# Patient Record
Sex: FEMALE | Race: BLACK OR AFRICAN AMERICAN | NOT HISPANIC OR LATINO | Employment: FULL TIME | URBAN - METROPOLITAN AREA
[De-identification: names, ages, dates, MRNs, and addresses within clinical notes are randomized per-mention and may not be internally consistent; named-entity substitution may affect disease eponyms.]

---

## 2017-01-20 ENCOUNTER — LAB VISIT (OUTPATIENT)
Dept: LAB | Facility: HOSPITAL | Age: 37
End: 2017-01-20
Attending: ADVANCED PRACTICE MIDWIFE
Payer: COMMERCIAL

## 2017-01-20 ENCOUNTER — OFFICE VISIT (OUTPATIENT)
Dept: OBSTETRICS AND GYNECOLOGY | Facility: CLINIC | Age: 37
End: 2017-01-20
Payer: COMMERCIAL

## 2017-01-20 VITALS
SYSTOLIC BLOOD PRESSURE: 104 MMHG | BODY MASS INDEX: 34.78 KG/M2 | HEIGHT: 67 IN | DIASTOLIC BLOOD PRESSURE: 70 MMHG | WEIGHT: 221.56 LBS

## 2017-01-20 DIAGNOSIS — Z01.419 ROUTINE GYNECOLOGICAL EXAMINATION: Primary | ICD-10-CM

## 2017-01-20 DIAGNOSIS — Z11.3 SCREEN FOR STD (SEXUALLY TRANSMITTED DISEASE): ICD-10-CM

## 2017-01-20 DIAGNOSIS — N89.8 VAGINAL IRRITATION: ICD-10-CM

## 2017-01-20 PROCEDURE — 99395 PREV VISIT EST AGE 18-39: CPT | Mod: S$GLB,,, | Performed by: ADVANCED PRACTICE MIDWIFE

## 2017-01-20 PROCEDURE — 99999 PR PBB SHADOW E&M-EST. PATIENT-LVL II: CPT | Mod: PBBFAC,,, | Performed by: ADVANCED PRACTICE MIDWIFE

## 2017-01-20 PROCEDURE — 86592 SYPHILIS TEST NON-TREP QUAL: CPT

## 2017-01-20 PROCEDURE — 88175 CYTOPATH C/V AUTO FLUID REDO: CPT

## 2017-01-20 PROCEDURE — 36415 COLL VENOUS BLD VENIPUNCTURE: CPT | Mod: PO

## 2017-01-20 PROCEDURE — 87591 N.GONORRHOEAE DNA AMP PROB: CPT

## 2017-01-20 PROCEDURE — 80074 ACUTE HEPATITIS PANEL: CPT

## 2017-01-20 RX ORDER — AZELASTINE 1 MG/ML
1 SPRAY, METERED NASAL
COMMUNITY
Start: 2017-01-17 | End: 2018-01-17

## 2017-01-20 RX ORDER — FLUOCINONIDE 0.5 MG/G
CREAM TOPICAL
COMMUNITY
Start: 2016-09-09 | End: 2017-09-09

## 2017-01-20 RX ORDER — TRIAMCINOLONE ACETONIDE 0.25 MG/G
CREAM TOPICAL 2 TIMES DAILY
Qty: 15 G | Refills: 0 | Status: SHIPPED | OUTPATIENT
Start: 2017-01-20 | End: 2017-09-25 | Stop reason: SDUPTHER

## 2017-01-20 RX ORDER — AZITHROMYCIN 250 MG/1
TABLET, FILM COATED ORAL
Refills: 0 | COMMUNITY
Start: 2017-01-17

## 2017-01-20 RX ORDER — CETIRIZINE HYDROCHLORIDE 5 MG/1
5 TABLET ORAL DAILY
COMMUNITY

## 2017-01-20 NOTE — PROGRESS NOTES
Subjective:       Melly Reddy is a 36 y.o. female here for a routine exam.  Current complaints: irritation after recent antibiotics.  Personal health questionnaire reviewed: yes. Sexually active, one partner, uses condoms.     Gynecologic History  No LMP recorded.  Contraception: condoms  Last Pap: . Results were: normal  Last mammogram: never.     Obstetric History  OB History    Para Term  AB SAB TAB Ectopic Multiple Living   4 2 2 0 2 1 1 0 0 2      # Outcome Date GA Lbr Michael/2nd Weight Sex Delivery Anes PTL Lv   4 SAB  5w0d    SAB      3 Term 07 38w0d  3.175 kg (7 lb) F Vag-Spont EPI N Y   2 TAB  5w0d    TAB      1 Term 10/06/00 40w0d  3.816 kg (8 lb 6.6 oz) M Vag-Spont EPI N Y            The following portions of the patient's history were reviewed and updated as appropriate: allergies, current medications, past family history, past medical history, past social history, past surgical history and problem list.    Review of Systems  A comprehensive review of systems was negative.      Objective:           General Appearance:    Alert, cooperative, no distress, appears stated age   Head:    Normocephalic, without obvious abnormality, atraumatic   Neck:   Supple, symmetrical, trachea midline, no adenopathy;     thyroid:  no enlargement/tenderness/nodules   Back:     Symmetric, no curvature, ROM normal, no CVA tenderness   Lungs:     Clear to auscultation bilaterally, respirations unlabored    Heart:    Regular rate and rhythm, S1 and S2 normal, no murmur   Breast Exam:    Skin soft and clear, no puckering noted. Nipples centrally placed without discharge or pain. No masses palpated in breast tissue or axilla. Educated on self breast exams.   Abdomen:     Soft, non-tender, bowel sounds active all four quadrants,     no masses, no organomegaly   Genitalia:   Vulva normal in appearance, no rash or lesions noted. Vagina moist, with adequate ruggae, pink in appearance, no tenderness or  lesions noted, no discharge noted. Cervix closed, pink, without bleeding or discharge. Specimens collected.  Bimanual exam: no CMT, uterus palpated approx 4 wk size, ovaries not palpated. Good vaginal tone noted. Rectal exam deferred.     Extremities:   Extremities normal, atraumatic, no cyanosis or edema   Pulses:   2+ and symmetric all extremities   Skin:   Skin color, texture, turgor normal, no rashes or lesions         Assessment:      Healthy female exam.      Plan:      Education reviewed: safe sex/STD prevention and self breast exams.  Contraception: condoms.  Follow up in: 1 year.

## 2017-01-21 LAB — RPR SER QL: NORMAL

## 2017-01-23 LAB
C TRACH DNA SPEC QL NAA+PROBE: NEGATIVE
HAV IGM SERPL QL IA: NEGATIVE
HBV CORE IGM SERPL QL IA: NEGATIVE
HBV SURFACE AG SERPL QL IA: NEGATIVE
HCV AB SERPL QL IA: NEGATIVE
N GONORRHOEA DNA SPEC QL NAA+PROBE: NEGATIVE

## 2017-01-27 ENCOUNTER — PATIENT MESSAGE (OUTPATIENT)
Dept: OBSTETRICS AND GYNECOLOGY | Facility: CLINIC | Age: 37
End: 2017-01-27

## 2017-07-31 ENCOUNTER — TELEPHONE (OUTPATIENT)
Dept: OBSTETRICS AND GYNECOLOGY | Facility: CLINIC | Age: 37
End: 2017-07-31

## 2017-07-31 DIAGNOSIS — N89.8 VAGINAL IRRITATION: ICD-10-CM

## 2017-07-31 RX ORDER — TRIAMCINOLONE ACETONIDE 0.25 MG/G
CREAM TOPICAL 2 TIMES DAILY
Qty: 15 G | Refills: 0 | Status: CANCELLED | OUTPATIENT
Start: 2017-07-31 | End: 2017-08-10

## 2017-09-25 DIAGNOSIS — N89.8 VAGINAL IRRITATION: ICD-10-CM

## 2017-09-25 RX ORDER — TRIAMCINOLONE ACETONIDE 0.25 MG/G
CREAM TOPICAL 2 TIMES DAILY
Qty: 15 G | Refills: 0 | OUTPATIENT
Start: 2017-09-25 | End: 2017-09-27 | Stop reason: SDUPTHER

## 2017-09-26 ENCOUNTER — PATIENT MESSAGE (OUTPATIENT)
Dept: OBSTETRICS AND GYNECOLOGY | Facility: CLINIC | Age: 37
End: 2017-09-26

## 2017-09-26 DIAGNOSIS — N89.8 VAGINAL IRRITATION: ICD-10-CM

## 2017-09-26 RX ORDER — TRIAMCINOLONE ACETONIDE 0.25 MG/G
CREAM TOPICAL 2 TIMES DAILY
Qty: 15 G | Refills: 0 | Status: CANCELLED | OUTPATIENT
Start: 2017-09-26 | End: 2017-10-06

## 2017-09-27 DIAGNOSIS — N89.8 VAGINAL IRRITATION: ICD-10-CM

## 2017-09-27 RX ORDER — TRIAMCINOLONE ACETONIDE 0.25 MG/G
CREAM TOPICAL 2 TIMES DAILY
Qty: 15 G | Refills: 0 | Status: SHIPPED | OUTPATIENT
Start: 2017-09-27 | End: 2018-03-11 | Stop reason: SDUPTHER

## 2017-09-27 NOTE — TELEPHONE ENCOUNTER
----- Message from Lisa Bee sent at 9/27/2017  1:53 PM CDT -----  Contact: patient  Patient said she contacted the nurse this morning by My Chart to get a refill on Triamcinolone 0.25 & she was told it would be called in but the pharmacy said they have not received it as of yet.  Patient's # is [phone]

## 2017-09-27 NOTE — TELEPHONE ENCOUNTER
----- Message from Sharon Cartwright CNM sent at 9/26/2017 10:57 PM CDT -----  Naty,    Can you please call the kenalog in to her pharmacy? It won't let me e prescribe it.    Sharon

## 2017-10-03 ENCOUNTER — PATIENT MESSAGE (OUTPATIENT)
Dept: OBSTETRICS AND GYNECOLOGY | Facility: CLINIC | Age: 37
End: 2017-10-03

## 2017-10-03 DIAGNOSIS — B37.31 YEAST VAGINITIS: Primary | ICD-10-CM

## 2017-10-03 RX ORDER — FLUCONAZOLE 150 MG/1
150 TABLET ORAL DAILY
Qty: 1 TABLET | Refills: 0 | Status: SHIPPED | OUTPATIENT
Start: 2017-10-03 | End: 2017-10-04 | Stop reason: SDUPTHER

## 2017-10-04 ENCOUNTER — TELEPHONE (OUTPATIENT)
Dept: OBSTETRICS AND GYNECOLOGY | Facility: CLINIC | Age: 37
End: 2017-10-04

## 2017-10-04 DIAGNOSIS — B37.31 YEAST VAGINITIS: ICD-10-CM

## 2017-10-04 RX ORDER — FLUCONAZOLE 150 MG/1
150 TABLET ORAL DAILY
Qty: 1 TABLET | Refills: 0 | Status: SHIPPED | OUTPATIENT
Start: 2017-10-04 | End: 2017-10-05

## 2017-10-04 NOTE — TELEPHONE ENCOUNTER
Left a message at the pharmacy because the Rx for Diflucan printed again instead of going electronically.

## 2018-03-11 DIAGNOSIS — N89.8 VAGINAL IRRITATION: ICD-10-CM

## 2018-03-11 RX ORDER — TRIAMCINOLONE ACETONIDE 0.25 MG/G
CREAM TOPICAL 2 TIMES DAILY
Qty: 15 G | Refills: 0 | Status: SHIPPED | OUTPATIENT
Start: 2018-03-11 | End: 2018-03-21